# Patient Record
Sex: FEMALE | Race: OTHER | NOT HISPANIC OR LATINO
[De-identification: names, ages, dates, MRNs, and addresses within clinical notes are randomized per-mention and may not be internally consistent; named-entity substitution may affect disease eponyms.]

---

## 2018-03-08 ENCOUNTER — APPOINTMENT (OUTPATIENT)
Dept: OBGYN | Facility: CLINIC | Age: 33
End: 2018-03-08
Payer: COMMERCIAL

## 2018-03-08 ENCOUNTER — ASOB RESULT (OUTPATIENT)
Age: 33
End: 2018-03-08

## 2018-03-08 VITALS
WEIGHT: 148 LBS | BODY MASS INDEX: 24.66 KG/M2 | DIASTOLIC BLOOD PRESSURE: 80 MMHG | SYSTOLIC BLOOD PRESSURE: 125 MMHG | HEIGHT: 65 IN | HEART RATE: 80 BPM

## 2018-03-08 PROCEDURE — 99395 PREV VISIT EST AGE 18-39: CPT

## 2018-03-08 PROCEDURE — 76830 TRANSVAGINAL US NON-OB: CPT

## 2018-03-08 PROCEDURE — 99213 OFFICE O/P EST LOW 20 MIN: CPT | Mod: 25

## 2018-03-13 LAB
CANDIDA VAG CYTO: NOT DETECTED
G VAGINALIS+PREV SP MTYP VAG QL MICRO: NOT DETECTED
HPV HIGH+LOW RISK DNA PNL CVX: NOT DETECTED
T VAGINALIS VAG QL WET PREP: NOT DETECTED

## 2018-03-16 LAB — CYTOLOGY CVX/VAG DOC THIN PREP: NORMAL

## 2018-12-15 ENCOUNTER — EMERGENCY (EMERGENCY)
Facility: HOSPITAL | Age: 33
LOS: 1 days | Discharge: HOME | End: 2018-12-15
Attending: EMERGENCY MEDICINE
Payer: COMMERCIAL

## 2018-12-15 VITALS
RESPIRATION RATE: 17 BRPM | OXYGEN SATURATION: 99 % | SYSTOLIC BLOOD PRESSURE: 121 MMHG | DIASTOLIC BLOOD PRESSURE: 74 MMHG | WEIGHT: 145.06 LBS | HEART RATE: 67 BPM | TEMPERATURE: 98 F | HEIGHT: 65 IN

## 2018-12-15 DIAGNOSIS — Z98.89 OTHER SPECIFIED POSTPROCEDURAL STATES: Chronic | ICD-10-CM

## 2018-12-15 LAB
ALBUMIN SERPL ELPH-MCNC: 4.3 G/DL — SIGNIFICANT CHANGE UP (ref 3.5–5)
ALP SERPL-CCNC: 112 U/L — SIGNIFICANT CHANGE UP (ref 40–120)
ALT FLD-CCNC: 45 U/L DA — SIGNIFICANT CHANGE UP (ref 10–60)
ANION GAP SERPL CALC-SCNC: 8 MMOL/L — SIGNIFICANT CHANGE UP (ref 5–17)
AST SERPL-CCNC: 26 U/L — SIGNIFICANT CHANGE UP (ref 10–40)
BASOPHILS # BLD AUTO: 0.1 K/UL — SIGNIFICANT CHANGE UP (ref 0–0.2)
BASOPHILS NFR BLD AUTO: 1 % — SIGNIFICANT CHANGE UP (ref 0–2)
BILIRUB DIRECT SERPL-MCNC: 0.2 MG/DL — SIGNIFICANT CHANGE UP (ref 0–0.2)
BILIRUB INDIRECT FLD-MCNC: 0.6 MG/DL — SIGNIFICANT CHANGE UP (ref 0.2–1)
BILIRUB SERPL-MCNC: 0.8 MG/DL — SIGNIFICANT CHANGE UP (ref 0.2–1.2)
BUN SERPL-MCNC: 15 MG/DL — SIGNIFICANT CHANGE UP (ref 7–18)
CALCIUM SERPL-MCNC: 9.9 MG/DL — SIGNIFICANT CHANGE UP (ref 8.4–10.5)
CHLORIDE SERPL-SCNC: 103 MMOL/L — SIGNIFICANT CHANGE UP (ref 96–108)
CO2 SERPL-SCNC: 26 MMOL/L — SIGNIFICANT CHANGE UP (ref 22–31)
CREAT SERPL-MCNC: 0.86 MG/DL — SIGNIFICANT CHANGE UP (ref 0.5–1.3)
EOSINOPHIL # BLD AUTO: 0.1 K/UL — SIGNIFICANT CHANGE UP (ref 0–0.5)
EOSINOPHIL NFR BLD AUTO: 1.7 % — SIGNIFICANT CHANGE UP (ref 0–6)
GLUCOSE SERPL-MCNC: 73 MG/DL — SIGNIFICANT CHANGE UP (ref 70–99)
HCG SERPL-ACNC: <1 MIU/ML — SIGNIFICANT CHANGE UP
HCT VFR BLD CALC: 40.9 % — SIGNIFICANT CHANGE UP (ref 34.5–45)
HGB BLD-MCNC: 13.2 G/DL — SIGNIFICANT CHANGE UP (ref 11.5–15.5)
LIDOCAIN IGE QN: 107 U/L — SIGNIFICANT CHANGE UP (ref 73–393)
LYMPHOCYTES # BLD AUTO: 1.5 K/UL — SIGNIFICANT CHANGE UP (ref 1–3.3)
LYMPHOCYTES # BLD AUTO: 17.7 % — SIGNIFICANT CHANGE UP (ref 13–44)
MCHC RBC-ENTMCNC: 29.4 PG — SIGNIFICANT CHANGE UP (ref 27–34)
MCHC RBC-ENTMCNC: 32.2 GM/DL — SIGNIFICANT CHANGE UP (ref 32–36)
MCV RBC AUTO: 91.2 FL — SIGNIFICANT CHANGE UP (ref 80–100)
MONOCYTES # BLD AUTO: 0.5 K/UL — SIGNIFICANT CHANGE UP (ref 0–0.9)
MONOCYTES NFR BLD AUTO: 5.5 % — SIGNIFICANT CHANGE UP (ref 2–14)
NEUTROPHILS # BLD AUTO: 6.1 K/UL — SIGNIFICANT CHANGE UP (ref 1.8–7.4)
NEUTROPHILS NFR BLD AUTO: 74.2 % — SIGNIFICANT CHANGE UP (ref 43–77)
PLATELET # BLD AUTO: 191 K/UL — SIGNIFICANT CHANGE UP (ref 150–400)
POTASSIUM SERPL-MCNC: 3.8 MMOL/L — SIGNIFICANT CHANGE UP (ref 3.5–5.3)
POTASSIUM SERPL-SCNC: 3.8 MMOL/L — SIGNIFICANT CHANGE UP (ref 3.5–5.3)
PROT SERPL-MCNC: 8.9 G/DL — HIGH (ref 6–8.3)
RBC # BLD: 4.48 M/UL — SIGNIFICANT CHANGE UP (ref 3.8–5.2)
RBC # FLD: 11.4 % — SIGNIFICANT CHANGE UP (ref 10.3–14.5)
SODIUM SERPL-SCNC: 137 MMOL/L — SIGNIFICANT CHANGE UP (ref 135–145)
WBC # BLD: 8.3 K/UL — SIGNIFICANT CHANGE UP (ref 3.8–10.5)
WBC # FLD AUTO: 8.3 K/UL — SIGNIFICANT CHANGE UP (ref 3.8–10.5)

## 2018-12-15 PROCEDURE — 80048 BASIC METABOLIC PNL TOTAL CA: CPT

## 2018-12-15 PROCEDURE — 85027 COMPLETE CBC AUTOMATED: CPT

## 2018-12-15 PROCEDURE — 76705 ECHO EXAM OF ABDOMEN: CPT

## 2018-12-15 PROCEDURE — 83690 ASSAY OF LIPASE: CPT

## 2018-12-15 PROCEDURE — 76705 ECHO EXAM OF ABDOMEN: CPT | Mod: 26

## 2018-12-15 PROCEDURE — 99284 EMERGENCY DEPT VISIT MOD MDM: CPT | Mod: 25

## 2018-12-15 PROCEDURE — 99285 EMERGENCY DEPT VISIT HI MDM: CPT

## 2018-12-15 PROCEDURE — 80076 HEPATIC FUNCTION PANEL: CPT

## 2018-12-15 PROCEDURE — 84702 CHORIONIC GONADOTROPIN TEST: CPT

## 2018-12-15 RX ORDER — FAMOTIDINE 10 MG/ML
20 INJECTION INTRAVENOUS ONCE
Qty: 0 | Refills: 0 | Status: COMPLETED | OUTPATIENT
Start: 2018-12-15 | End: 2018-12-15

## 2018-12-15 RX ADMIN — FAMOTIDINE 20 MILLIGRAM(S): 10 INJECTION INTRAVENOUS at 15:09

## 2018-12-15 NOTE — ED PROVIDER NOTE - PMH
Elevated liver enzymes  "due to previous medicaiton for MS".  Pt reports she was switched to Copaxone in Sept 2014, and LFT's have come down.  Multiple sclerosis    Ovarian cyst

## 2018-12-15 NOTE — ED ADULT NURSE NOTE - CHPI ED NUR SYMPTOMS NEG
no blood in stool/no burning urination/no chills/no diarrhea/no abdominal distension/no dysuria/no fever/no hematuria

## 2018-12-15 NOTE — ED PROVIDER NOTE - PROGRESS NOTE DETAILS
pt remains asymptomatic in ED, abdominal exam remains benign, ready for dc with close pcp and gi f/u, given extensive return precautions which she showed understanding of.

## 2018-12-15 NOTE — ED PROVIDER NOTE - MEDICAL DECISION MAKING DETAILS
r/o pancreatitis, biliary colic and cholecystitis. Likely GERD with esophogeal spasm. Pt very well appearing. Will check labs, hcg, RUQ sono then reassess. Likely d/c with GI and PCP f/u and return precautions.

## 2018-12-15 NOTE — ED PROVIDER NOTE - OBJECTIVE STATEMENT
34 y/o F pt with Hx of MS, on gabapentin, presents to ED c/o 1 day of intermittent epigastric pain without radiation. No aggravating or alleviating factors. Every now and then she just feels severe pain in epigastric area that resolves on its own. No lower abdominal pain, chest pain, prior abdominal surgeries, alcohol use, NSAID use, urinary symptoms, vag bleeding/discharge.

## 2018-12-15 NOTE — ED ADULT NURSE NOTE - OBJECTIVE STATEMENT
Patient presents to ED c/o sharp epigastric pain starting this morning with 2 episodes of vomiting. Patient c/o crampy pain on pain scale 8/10 . Patient denies taking anything

## 2019-05-07 ENCOUNTER — APPOINTMENT (OUTPATIENT)
Dept: OBGYN | Facility: CLINIC | Age: 34
End: 2019-05-07
Payer: COMMERCIAL

## 2019-05-07 ENCOUNTER — ASOB RESULT (OUTPATIENT)
Age: 34
End: 2019-05-07

## 2019-05-07 VITALS
HEIGHT: 65 IN | BODY MASS INDEX: 24.83 KG/M2 | HEART RATE: 80 BPM | WEIGHT: 149 LBS | DIASTOLIC BLOOD PRESSURE: 75 MMHG | SYSTOLIC BLOOD PRESSURE: 122 MMHG

## 2019-05-07 DIAGNOSIS — N80.9 ENDOMETRIOSIS, UNSPECIFIED: ICD-10-CM

## 2019-05-07 PROCEDURE — 76830 TRANSVAGINAL US NON-OB: CPT

## 2019-05-07 PROCEDURE — 99395 PREV VISIT EST AGE 18-39: CPT

## 2019-05-07 NOTE — COUNSELING
[Breast Self Exam] : breast self exam [Nutrition] : nutrition [Exercise] : exercise [Vitamins/Supplements] : vitamins/supplements [Preconception Care] : preconception care [Other ___] : [unfilled]

## 2019-05-31 ENCOUNTER — APPOINTMENT (OUTPATIENT)
Dept: ANTEPARTUM | Facility: CLINIC | Age: 34
End: 2019-05-31
Payer: COMMERCIAL

## 2019-05-31 ENCOUNTER — ASOB RESULT (OUTPATIENT)
Age: 34
End: 2019-05-31

## 2019-05-31 PROCEDURE — 99241 OFFICE CONSULTATION NEW/ESTAB PATIENT 15 MIN: CPT | Mod: 25

## 2019-05-31 PROCEDURE — 76830 TRANSVAGINAL US NON-OB: CPT

## 2019-05-31 PROCEDURE — 76857 US EXAM PELVIC LIMITED: CPT

## 2019-06-14 ENCOUNTER — TRANSCRIPTION ENCOUNTER (OUTPATIENT)
Age: 34
End: 2019-06-14

## 2019-06-18 ENCOUNTER — LABORATORY RESULT (OUTPATIENT)
Age: 34
End: 2019-06-18

## 2019-06-18 ENCOUNTER — APPOINTMENT (OUTPATIENT)
Dept: OBGYN | Facility: CLINIC | Age: 34
End: 2019-06-18
Payer: COMMERCIAL

## 2019-06-18 ENCOUNTER — APPOINTMENT (OUTPATIENT)
Dept: OBGYN | Facility: CLINIC | Age: 34
End: 2019-06-18

## 2019-06-18 VITALS
SYSTOLIC BLOOD PRESSURE: 106 MMHG | BODY MASS INDEX: 24.83 KG/M2 | DIASTOLIC BLOOD PRESSURE: 65 MMHG | HEART RATE: 72 BPM | WEIGHT: 149 LBS | HEIGHT: 65 IN

## 2019-06-18 PROBLEM — N80.9 ENDOMETRIOSIS: Status: ACTIVE | Noted: 2018-03-08

## 2019-06-18 PROCEDURE — 99213 OFFICE O/P EST LOW 20 MIN: CPT

## 2019-06-20 LAB
C TRACH RRNA SPEC QL NAA+PROBE: NOT DETECTED
CYTOMEGALOVIRUS ABS IGM: <30 AU/ML
HERPES SIMPLEX 1 AND 2 ABS IGM: 1.38 RATIO
HIV1+2 AB SPEC QL IA.RAPID: NONREACTIVE
N GONORRHOEA RRNA SPEC QL NAA+PROBE: NOT DETECTED
RUBV IGG FLD-ACNC: 2.1 INDEX
RUBV IGG SER-IMP: POSITIVE
RUBV IGM FLD-ACNC: <20 AU/ML
SOURCE AMPLIFICATION: NORMAL
T GONDII AB SER-IMP: NEGATIVE
T GONDII AB SER-IMP: NEGATIVE
T GONDII IGG SER QL: <3 IU/ML
T GONDII IGM SER QL: <3 AU/ML
T3 SERPL-MCNC: 112 NG/DL
T4 FREE SERPL-MCNC: 1.2 NG/DL
TOXOPLASMA GONDII ABS IGM: <3 AU/ML
VZV AB TITR SER: POSITIVE
VZV IGG SER IF-ACNC: 974.2 INDEX
VZV IGM SER IF-ACNC: <0.91 INDEX

## 2019-06-25 ENCOUNTER — APPOINTMENT (OUTPATIENT)
Dept: OBGYN | Facility: CLINIC | Age: 34
End: 2019-06-25

## 2019-06-26 LAB — T PALLIDUM AB SER QL IA: NEGATIVE

## 2019-07-24 ENCOUNTER — APPOINTMENT (OUTPATIENT)
Dept: NEUROLOGY | Facility: CLINIC | Age: 34
End: 2019-07-24
Payer: COMMERCIAL

## 2019-07-24 VITALS
BODY MASS INDEX: 24.99 KG/M2 | WEIGHT: 150 LBS | SYSTOLIC BLOOD PRESSURE: 119 MMHG | HEIGHT: 65 IN | DIASTOLIC BLOOD PRESSURE: 75 MMHG | HEART RATE: 69 BPM

## 2019-07-24 PROCEDURE — 99205 OFFICE O/P NEW HI 60 MIN: CPT

## 2019-07-24 NOTE — HISTORY OF PRESENT ILLNESS
[FreeTextEntry1] : Reason for consult: MS\par \par HPI: MICHELET GERARD is a 33 year old woman \par \par 2012 - electrical current sensation upon neck flexion X 1.5 years. did not get to a neurologist.\par 2013 - abrupt abdominal tightness and numbness. saw neurologist, had MRIs showing lesions. Admitted to [facility]. No LP. Got IVMP. \par Started following with Dr. Ellington.\par Initially started DMF but LFTs went up to ~1300 per pt. Had liver biopsy. Stopped DMF within 3m, normalized.\par 9/2014 - started copaxone. stopped copaxone in summer 2018 and off for about 6 months. Some injection reactions mostly on legs. \par [id number] - one new brain lesion. otherwise MRIs have been reportedly stable per pt.\par Early 2019 - pain and numbness down L arm/shoulder.\par 6/2019 - restarted copaxone, but stopped in 7/2019.\par Had MRI 5/2019 and reportedly no new lesions.\par Actively trying to get pregnant.\par \par ROS/Current Sx:\par pain in the LLE \par brain fog, word finding difficulty. worse with heat.\par occ muscle spasm - kyler horse in R calf.\par \par PMHX:\par possible meningitis as a baby.\par hashimotos thyroiditis - recently diagnosed.\par MS\par endometriosis\par \par MEDS:\par gabapentin prn\par \par ALL: PCN\par \par SHx: no tob, occ etoh, no drugs. works as  for [employer].\par \par FHx: no MS, no AI, no neuro\par \par Vitals: unremarkable\par \par Exam:\par \par AO3.  Normally conversant.  Follows commands, names, and repeats.  Good attention.\par \par PERRL, VFF, EOMI, no nystagmus, face symmetric, TUP at midline.\par \par Motor: \par                                                 R:                               L:\par Del                                           5                                5\par Bi                                              5                               5\par Tri                                            5                               5\par Wrist Extensors                      5                               5\par Finger abductors                    5                               5\par                                         5                               5 \par \par HF                                           5                               5\par KE                                           5                               5\par KF                                           5                               5\par DF                                           5                               5\par PF                                           5                               5\par \par Tone                                       R                               L\par UE                                          0                                0 \par LE                                          0                                0\par \par Sensory                                RUE                      LUE                 RLE                LLE     \par LT                                           +                            +                      +                   +\par Vib                                          +                            +                      +                   +\par JPS                                         +                            +                      +                   +\par PP                                         +                            +                      +                   +\par Temp                                     +                            +                      +                   +\par \par Reflexes:\par                                              R                             L                            \par Biceps                                  1                            2\par BR                                        2                             2\par Triceps                              \par Pat                                       3                            3 \par AJ                                        3                           3\par \par TOES                                    F                            F\par \par Coordination:\par                                              R                             L                       \par FTN                                       0                             0 \par REAGAN                                      0                            0\par HTS                                      0                             0 \par \par Other                                                                          \par  \par Gait: normal, can heel, toe, tandem\par \par                     Assistance: none\par \par \par MRI T spine 2013 - read as t12 lesion\par MRI T spine 8/2015 - read as normal\par \par MRI brain 12/2014 - read as demyelinating lesions, improved from 2013.\par \par MRI C spine 5/2015 - read as t2h at C5-6.\par \par AP: RRMS based on hx and imaging reports. Likely clinically stable. essentially off copaxone since last summer. Actively trying to conceive. Discussed option to restart copaxone. Pt opts to hold off any DMT while trying to get pregnant.\par \par Advised her to RTC once during pregancy.\par \par \par \par \par \par \par

## 2019-08-28 ENCOUNTER — OTHER (OUTPATIENT)
Age: 34
End: 2019-08-28

## 2019-09-03 ENCOUNTER — CHART COPY (OUTPATIENT)
Age: 34
End: 2019-09-03

## 2019-09-04 ENCOUNTER — APPOINTMENT (OUTPATIENT)
Dept: NEUROLOGY | Facility: CLINIC | Age: 34
End: 2019-09-04
Payer: COMMERCIAL

## 2019-09-04 VITALS
BODY MASS INDEX: 24.99 KG/M2 | HEIGHT: 65 IN | SYSTOLIC BLOOD PRESSURE: 113 MMHG | HEART RATE: 76 BPM | WEIGHT: 150 LBS | DIASTOLIC BLOOD PRESSURE: 70 MMHG

## 2019-09-04 PROCEDURE — 99214 OFFICE O/P EST MOD 30 MIN: CPT | Mod: 25

## 2019-09-04 PROCEDURE — S0028: CPT

## 2019-09-04 PROCEDURE — 96365 THER/PROPH/DIAG IV INF INIT: CPT

## 2019-09-04 RX ORDER — METHYLPREDNISOLONE SODIUM SUCCINATE 1 G/16ML
1000 INJECTION, POWDER, LYOPHILIZED, FOR SOLUTION INTRAMUSCULAR; INTRAVENOUS
Qty: 1 | Refills: 0 | Status: ACTIVE | COMMUNITY
Start: 2019-09-04

## 2019-09-04 RX ORDER — METHYLPREDNISOLONE SODIUM SUCCINATE 500 MG/ML
500 INJECTION, POWDER, FOR SOLUTION INTRAMUSCULAR; INTRAVENOUS
Refills: 0 | Status: COMPLETED | OUTPATIENT
Start: 2019-09-04

## 2019-09-04 RX ADMIN — METHYLPREDNISOLONE SODIUM SUCCINATE 0 MG: 500 INJECTION, POWDER, FOR SOLUTION INTRAMUSCULAR; INTRAVENOUS at 00:00

## 2019-09-04 NOTE — HISTORY OF PRESENT ILLNESS
[FreeTextEntry1] : Initial hx 7/2019\par 2012 - electrical current sensation upon neck flexion X 1.5 years. did not get to a neurologist.\par 2013 - abrupt abdominal tightness and numbness. saw neurologist, had MRIs showing lesions. Admitted to [facility]. No LP. Got IVMP. \par Started following with Dr. Ellington.\par Initially started DMF but LFTs went up to ~1300 per pt. Had liver biopsy. Stopped DMF within 3m, normalized.\par 9/2014 - started copaxone. stopped copaxone in summer 2018 and off for about 6 months. Some injection reactions mostly on legs. \par [id number] - one new brain lesion. otherwise MRIs have been reportedly stable per pt.\par Early 2019 - pain and numbness down L arm/shoulder.\par 6/2019 - restarted copaxone, but stopped in 7/2019.\par Had MRI 5/2019 and reportedly no new lesions.\par Actively trying to get pregnant.\par \par Subj interval:\par \par 3wks of intense lhermitte's sign, has had it before but milder, worsened over past 1 month. Also with "heat" sensation on both calves L>R, no pain. Associated with extreme fatigue, more emotional/irritable, occ sharp pain in L foot. Had episode of L arm incoordination lasting seconds. More brain fog. No change in BB dysfunction, but chronic urinary urgency.\par \par Currently trying to conceive. Currently not pregnant.\par \par ROS/Current Sx:\par pain in the LLE \par brain fog, word finding difficulty. worse with heat.\par occ muscle spasm - kyler horse in R calf.\par urinary urgency\par \par PMHX:\par possible meningitis as a baby.\par hashimotos thyroiditis - recently diagnosed.\par MS\par endometriosis\par \par MEDS:\par none\par \par Exam:\par \par AO3. Normally conversant. Follows commands, names, and repeats. Good attention.\par \par PERRL, VFF, EOMI, no nystagmus, face symmetric, TUP at midline.\par \par Motor: \par    R:  L:\par Del   5  5\par Bi   5  5\par Tri   5  5\par Wrist Extensors  5  5\par Finger abductors  5  5\par    5  5 \par \par HF   5  5\par KE   5  5\par KF   5  5\par DF   5  5\par PF   5  5\par \par Tone   R  L\par UE   0  0 \par LE   0  0\par \par Sensory  RUE  LUE  RLE LLE \par LT   +  +  +  +\par Vib   +  +  +  mod\par JPS   +  +  +  +\par PP   +  +  +  +\par Temp   +  +  +  +\par \par Reflexes:\par    R  L  \par Biceps   2  2\par BR   2  2\par Triceps  \par Pat   3  3+ \par AJ   3  3\par \par TOES   F  F\par \par Coordination:\par    R  L  \par FTN   0  0 \par REAGAN   0  0\par HTS   0  0 \par \par Other     \par  \par Gait: normal, can heel, toe, tandem\par \par   Assistance: none\par \par \par MRI T spine 2013 - read as t12 lesion\par MRI T spine 8/2015 - read as normal\par \par MRI brain 12/2014 - read as demyelinating lesions, improved from 2013.\par \par MRI C spine 5/2015 - read as t2h at C5-6.\par \par AP: RRMS based on hx and imaging reports. Likely clinically stable. essentially off copaxone since last summer. Actively trying to conceive. Pt opts to hold off any DMT while trying to get pregnant.\par \par New sx, likely mild new MS relapse given mild new findings on exam.\par \par - IVMP x3-5d\par - continue off DMT for now while trying to conceive.

## 2019-09-05 ENCOUNTER — OUTPATIENT (OUTPATIENT)
Dept: OUTPATIENT SERVICES | Facility: HOSPITAL | Age: 34
LOS: 1 days | End: 2019-09-05
Payer: COMMERCIAL

## 2019-09-05 ENCOUNTER — APPOINTMENT (OUTPATIENT)
Dept: NEUROLOGY | Facility: CLINIC | Age: 34
End: 2019-09-05

## 2019-09-05 ENCOUNTER — CHART COPY (OUTPATIENT)
Age: 34
End: 2019-09-05

## 2019-09-05 ENCOUNTER — APPOINTMENT (OUTPATIENT)
Age: 34
End: 2019-09-05

## 2019-09-05 VITALS
OXYGEN SATURATION: 98 % | TEMPERATURE: 98 F | HEART RATE: 80 BPM | RESPIRATION RATE: 18 BRPM | DIASTOLIC BLOOD PRESSURE: 77 MMHG | SYSTOLIC BLOOD PRESSURE: 134 MMHG

## 2019-09-05 DIAGNOSIS — Z98.89 OTHER SPECIFIED POSTPROCEDURAL STATES: Chronic | ICD-10-CM

## 2019-09-05 DIAGNOSIS — G35 MULTIPLE SCLEROSIS: ICD-10-CM

## 2019-09-05 PROCEDURE — 96365 THER/PROPH/DIAG IV INF INIT: CPT

## 2019-09-05 RX ORDER — ACETAMINOPHEN 500 MG
1000 TABLET ORAL ONCE
Refills: 0 | Status: COMPLETED | OUTPATIENT
Start: 2019-09-05 | End: 2019-09-05

## 2019-09-05 RX ADMIN — Medication 1000 MILLIGRAM(S): at 15:20

## 2019-09-05 RX ADMIN — Medication 172 MILLIGRAM(S): at 14:45

## 2019-09-05 RX ADMIN — Medication 1000 MILLIGRAM(S): at 14:20

## 2019-09-05 RX ADMIN — Medication 1000 MILLIGRAM(S): at 16:15

## 2019-09-06 ENCOUNTER — APPOINTMENT (OUTPATIENT)
Age: 34
End: 2019-09-06

## 2019-09-06 ENCOUNTER — APPOINTMENT (OUTPATIENT)
Dept: NEUROLOGY | Facility: CLINIC | Age: 34
End: 2019-09-06
Payer: COMMERCIAL

## 2019-09-06 PROCEDURE — S0028: CPT

## 2019-10-16 ENCOUNTER — APPOINTMENT (OUTPATIENT)
Dept: NEUROLOGY | Facility: CLINIC | Age: 34
End: 2019-10-16
Payer: COMMERCIAL

## 2019-10-16 VITALS
HEIGHT: 65 IN | WEIGHT: 150 LBS | HEART RATE: 76 BPM | BODY MASS INDEX: 24.99 KG/M2 | SYSTOLIC BLOOD PRESSURE: 128 MMHG | DIASTOLIC BLOOD PRESSURE: 82 MMHG

## 2019-10-16 LAB
25(OH)D3 SERPL-MCNC: 37.1 NG/ML
ALBUMIN SERPL ELPH-MCNC: 4.7 G/DL
ALP BLD-CCNC: 102 U/L
ALT SERPL-CCNC: 31 U/L
ANION GAP SERPL CALC-SCNC: 12 MMOL/L
AST SERPL-CCNC: 25 U/L
BASOPHILS # BLD AUTO: 0.04 K/UL
BASOPHILS NFR BLD AUTO: 0.7 %
BILIRUB SERPL-MCNC: 0.3 MG/DL
BUN SERPL-MCNC: 13 MG/DL
CALCIUM SERPL-MCNC: 9.5 MG/DL
CHLORIDE SERPL-SCNC: 103 MMOL/L
CO2 SERPL-SCNC: 24 MMOL/L
CREAT SERPL-MCNC: 0.75 MG/DL
EOSINOPHIL # BLD AUTO: 0.19 K/UL
EOSINOPHIL NFR BLD AUTO: 3.1 %
GLUCOSE SERPL-MCNC: 91 MG/DL
HCT VFR BLD CALC: 36.8 %
HGB BLD-MCNC: 11.6 G/DL
IMM GRANULOCYTES NFR BLD AUTO: 0.3 %
LYMPHOCYTES # BLD AUTO: 1.86 K/UL
LYMPHOCYTES NFR BLD AUTO: 30.7 %
MAN DIFF?: NORMAL
MCHC RBC-ENTMCNC: 28.4 PG
MCHC RBC-ENTMCNC: 31.5 GM/DL
MCV RBC AUTO: 90 FL
MONOCYTES # BLD AUTO: 0.48 K/UL
MONOCYTES NFR BLD AUTO: 7.9 %
NEUTROPHILS # BLD AUTO: 3.47 K/UL
NEUTROPHILS NFR BLD AUTO: 57.3 %
PLATELET # BLD AUTO: 227 K/UL
POTASSIUM SERPL-SCNC: 4.1 MMOL/L
PROT SERPL-MCNC: 7.5 G/DL
RBC # BLD: 4.09 M/UL
RBC # FLD: 12.5 %
SODIUM SERPL-SCNC: 139 MMOL/L
T4 FREE SERPL-MCNC: 1 NG/DL
TSH SERPL-ACNC: 2.75 UIU/ML
WBC # FLD AUTO: 6.06 K/UL

## 2019-10-16 PROCEDURE — 99214 OFFICE O/P EST MOD 30 MIN: CPT

## 2019-10-22 NOTE — HISTORY OF PRESENT ILLNESS
[FreeTextEntry1] : Initial hx 7/2019\par 2012 - electrical current sensation upon neck flexion X 1.5 years. did not get to a neurologist.\par 2013 - abrupt abdominal tightness and numbness. saw neurologist, had MRIs showing lesions. Admitted to [facility]. No LP. Got IVMP. \par Started following with Dr. Ellington.\par Initially started DMF but LFTs went up to ~1300 per pt. Had liver biopsy. Stopped DMF within 3m, normalized.\par 9/2014 - started copaxone. stopped copaxone in summer 2018 and off for about 6 months. Some injection reactions mostly on legs. \par [id number] - one new brain lesion. otherwise MRIs have been reportedly stable per pt.\par Early 2019 - pain and numbness down L arm/shoulder.\par 6/2019 - restarted copaxone, but stopped in 7/2019.\par Had MRI 5/2019 and reportedly no new lesions.\par Actively trying to get pregnant.\par 9/2019 - intense lhermitte's sign, has had it before but milder, worsened over past 1 month. Also with "heat" sensation on both calves L>R, no pain. thought to be a possible relapse but MRI C+T showed no new lesions. got IVMP x3 with some improvement. \par \par Subj interval:\par \par lhermittes lasted about 1 month.\par \par Since 9/2019, brain fog and fatigue have become worse. Some depressed mood - sees a therapist. denies suicidality.\par \par Currently trying to conceive. Currently not pregnant.\par \par ROS/Current Sx:\par pain in the LLE \par brain fog, word finding difficulty. worse with heat.\par occ muscle spasm - kyler horse in R calf.\par urinary urgency\par \par PMHX:\par possible meningitis as a baby.\par hashimotos thyroiditis - recently diagnosed.\par MS\par endometriosis\par \par MEDS:\par none\par \par Exam:\par \par AO3. Normally conversant. Follows commands, names, and repeats. Good attention.\par \par PERRL, VFF, EOMI, no nystagmus, face symmetric, TUP at midline.\par \par Motor: \par  R: L:\par Del 5 5\par Bi 5 5\par Tri 5 5\par Wrist Extensors 5 5\par Finger abductors 5 5\par  5 5 \par \par HF 5 5\par KE 5 5\par KF 5 5\par DF 5 5\par PF 5 5\par \par Tone R L\par UE 0 0 \par LE 0 0\par \par Sensory RUE LUE RLE LLE \par LT + + + +\par Vib + + + mod\par JPS + + + +\par PP + + + +\par Temp + + + +\par \par Reflexes:\par  R L \par Biceps 2 2\par BR 2 2\par Triceps \par Pat 3 3+ \par AJ 3 3\par \par TOES F F\par \par Coordination:\par  R L \par FTN 0 0 \par REAGAN 0 0\par HTS 0 0 \par \par Other \par  \par Gait: normal, can heel, toe, tandem\par \par  Assistance: none\par \par \par MRI T spine 2013 - read as t12 lesion\par MRI T spine 8/2015 - read as normal\par MRI T spine 9/2019 - read as unchanged. i agree\par \par MRI brain 12/2014 - read as demyelinating lesions, improved from 2013.\par \par MRI C spine 5/2015 - read as t2h at C5-6.\par MRI C spine 9/2019 read as unchanged c5/6 lesion. i agree\par \par \par AP: RRMS based on hx and imaging. Essentially off copaxone since last summer. Actively trying to conceive. Pt opts to hold off any DMT while trying to get pregnant.\par \par Worsened fatigue and brain fog. However, exam is unchanged.\par \par - check blood work inc TSH\par - continue off DMT for now while trying to conceive. \par - RTC 3m

## 2020-01-08 ENCOUNTER — APPOINTMENT (OUTPATIENT)
Dept: NEUROLOGY | Facility: CLINIC | Age: 35
End: 2020-01-08
Payer: COMMERCIAL

## 2020-01-08 ENCOUNTER — APPOINTMENT (OUTPATIENT)
Dept: NEUROLOGY | Facility: CLINIC | Age: 35
End: 2020-01-08

## 2020-01-08 VITALS
BODY MASS INDEX: 25.83 KG/M2 | DIASTOLIC BLOOD PRESSURE: 78 MMHG | SYSTOLIC BLOOD PRESSURE: 123 MMHG | HEIGHT: 65 IN | WEIGHT: 155 LBS | HEART RATE: 65 BPM

## 2020-01-08 PROCEDURE — 99214 OFFICE O/P EST MOD 30 MIN: CPT

## 2020-01-08 NOTE — HISTORY OF PRESENT ILLNESS
[FreeTextEntry1] : Initial hx 7/2019\par 2012 - electrical current sensation upon neck flexion X 1.5 years. did not get to a neurologist.\par 2013 - abrupt abdominal tightness and numbness. saw neurologist, had MRIs showing lesions. Admitted to [facility]. No LP. Got IVMP. \par Started following with Dr. Ellington.\par Initially started DMF but LFTs went up to ~1300 per pt. Had liver biopsy. Stopped DMF within 3m, normalized.\par 9/2014 - started copaxone. stopped copaxone in summer 2018 and off for about 6 months. Some injection reactions mostly on legs. \par [id number] - one new brain lesion. otherwise MRIs have been reportedly stable per pt.\par Early 2019 - pain and numbness down L arm/shoulder.\par 6/2019 - restarted copaxone, but stopped in 7/2019.\par Had MRI 5/2019 and reportedly no new lesions.\par Actively trying to get pregnant.\par 9/2019 - intense lhermitte's sign, has had it before but milder, worsened over past 1 month. Also with "heat" sensation on both calves L>R, no pain. thought to be a possible relapse but MRI C+T showed no new lesions. got IVMP x3 with some improvement. lhermittes lasted about 1 month.\par \par Subj interval:\par \par Dental work done in 11/2019. Soon after, developed vertigo - dizziness, strange metal taste in mouth, nausea. Thought she was pregnant but was not. Went to ENT who felt like she had BPPV but testing was negative. Some L hand clumsiness in recent weeks. This persists.\par \par Currently trying to conceive. Currently not pregnant.\par \par ROS/Current Sx:\par pain in the LLE \par brain fog, word finding difficulty. worse with heat.\par occ muscle spasm - kyler horse in R calf.\par urinary urgency\par \par PMHX:\par possible meningitis as a baby.\par hashimotos thyroiditis - recently diagnosed.\par MS\par endometriosis\par \par MEDS:\par none\par \par Exam:\par \par AO3. Normally conversant. Follows commands, names, and repeats. Good attention.\par \par PERRL, VFF, EOMI, no nystagmus, face symmetric, TUP at midline.\par \par Motor: \par  R: L:\par Del 5 5\par Bi 5 5\par Tri 5 5\par Wrist Extensors 5 5\par Finger abductors 5 5\par  5 5 \par \par HF 5 5\par KE 5 5\par KF 5 5\par DF 5 5\par PF 5 5\par \par Tone R L\par UE 0 0 \par LE 0 0\par \par Sensory RUE LUE RLE LLE \par LT + + + +\par Vib + + + mod\par JPS + + + +\par PP + + + +\par Temp + + + +\par \par Reflexes:\par  R L \par Biceps 2 2\par BR 2 2\par Triceps \par Pat 3 3+ \par AJ 3 3\par \par TOES F F\par \par Coordination:\par  R L \par FTN 0 0 \par REAGAN 0 0\par HTS 0 0 \par \par Other \par  \par Gait: normal, can heel, toe, tandem\par \par  Assistance: none\par \par \par MRI T spine 2013 - read as t12 lesion\par MRI T spine 8/2015 - read as normal\par MRI T spine 9/2019 - read as unchanged. i agree\par \par MRI brain 12/2014 - read as demyelinating lesions, improved from 2013.\par \par MRI C spine 5/2015 - read as t2h at C5-6.\par MRI C spine 9/2019 read as unchanged c5/6 lesion. i agree\par \par \par AP: RRMS based on hx and imaging. Essentially off copaxone since summer 2018. Actively trying to conceive. Pt opts to hold off any DMT while trying to get pregnant.\par \par New dizziness and dysguesia in 11/2019 which persists. ? mild relapse.\par \par - new brain MRI now given ? new relapse. If substantial change, will recommend to go back on DMT.\par - will attempt to obtain prior MRI brain on CD for review.\par - RTC 3m

## 2020-01-13 ENCOUNTER — APPOINTMENT (OUTPATIENT)
Dept: OBGYN | Facility: CLINIC | Age: 35
End: 2020-01-13
Payer: COMMERCIAL

## 2020-01-13 VITALS
SYSTOLIC BLOOD PRESSURE: 115 MMHG | DIASTOLIC BLOOD PRESSURE: 74 MMHG | HEIGHT: 65 IN | WEIGHT: 158 LBS | BODY MASS INDEX: 26.33 KG/M2 | HEART RATE: 88 BPM

## 2020-01-13 PROCEDURE — 99213 OFFICE O/P EST LOW 20 MIN: CPT

## 2020-01-14 ENCOUNTER — FORM ENCOUNTER (OUTPATIENT)
Age: 35
End: 2020-01-14

## 2020-01-14 LAB
BASOPHILS # BLD AUTO: 0.04 K/UL
BASOPHILS NFR BLD AUTO: 0.6 %
EOSINOPHIL # BLD AUTO: 0.22 K/UL
EOSINOPHIL NFR BLD AUTO: 3.4 %
HCG SERPL-MCNC: <1 MIU/ML
HCT VFR BLD CALC: 36.8 %
HGB BLD-MCNC: 11.9 G/DL
IMM GRANULOCYTES NFR BLD AUTO: 0.3 %
LYMPHOCYTES # BLD AUTO: 1.85 K/UL
LYMPHOCYTES NFR BLD AUTO: 28.7 %
MAN DIFF?: NORMAL
MCHC RBC-ENTMCNC: 29.5 PG
MCHC RBC-ENTMCNC: 32.3 GM/DL
MCV RBC AUTO: 91.1 FL
MONOCYTES # BLD AUTO: 0.7 K/UL
MONOCYTES NFR BLD AUTO: 10.9 %
NEUTROPHILS # BLD AUTO: 3.61 K/UL
NEUTROPHILS NFR BLD AUTO: 56.1 %
PLATELET # BLD AUTO: 243 K/UL
RBC # BLD: 4.04 M/UL
RBC # FLD: 12.5 %
WBC # FLD AUTO: 6.44 K/UL

## 2020-01-15 ENCOUNTER — OUTPATIENT (OUTPATIENT)
Dept: OUTPATIENT SERVICES | Facility: HOSPITAL | Age: 35
LOS: 1 days | End: 2020-01-15
Payer: COMMERCIAL

## 2020-01-15 ENCOUNTER — APPOINTMENT (OUTPATIENT)
Dept: MRI IMAGING | Facility: IMAGING CENTER | Age: 35
End: 2020-01-15

## 2020-01-15 DIAGNOSIS — Z98.89 OTHER SPECIFIED POSTPROCEDURAL STATES: Chronic | ICD-10-CM

## 2020-01-15 DIAGNOSIS — G35 MULTIPLE SCLEROSIS: ICD-10-CM

## 2020-01-15 PROCEDURE — A9585: CPT

## 2020-01-15 PROCEDURE — 70553 MRI BRAIN STEM W/O & W/DYE: CPT

## 2020-01-15 PROCEDURE — 70553 MRI BRAIN STEM W/O & W/DYE: CPT | Mod: 26

## 2020-01-21 ENCOUNTER — CHART COPY (OUTPATIENT)
Age: 35
End: 2020-01-21

## 2020-01-24 ENCOUNTER — APPOINTMENT (OUTPATIENT)
Dept: NEUROLOGY | Facility: CLINIC | Age: 35
End: 2020-01-24
Payer: COMMERCIAL

## 2020-01-24 VITALS
DIASTOLIC BLOOD PRESSURE: 70 MMHG | TEMPERATURE: 98.6 F | WEIGHT: 158 LBS | HEIGHT: 65 IN | BODY MASS INDEX: 26.33 KG/M2 | OXYGEN SATURATION: 97 % | HEART RATE: 75 BPM | SYSTOLIC BLOOD PRESSURE: 109 MMHG

## 2020-01-24 PROCEDURE — 99214 OFFICE O/P EST MOD 30 MIN: CPT

## 2020-10-04 ENCOUNTER — TRANSCRIPTION ENCOUNTER (OUTPATIENT)
Age: 35
End: 2020-10-04

## 2020-10-14 ENCOUNTER — APPOINTMENT (OUTPATIENT)
Dept: OBGYN | Facility: CLINIC | Age: 35
End: 2020-10-14
Payer: COMMERCIAL

## 2020-10-14 VITALS
SYSTOLIC BLOOD PRESSURE: 122 MMHG | BODY MASS INDEX: 27.49 KG/M2 | WEIGHT: 165 LBS | TEMPERATURE: 97.9 F | DIASTOLIC BLOOD PRESSURE: 75 MMHG | HEART RATE: 66 BPM | HEIGHT: 65 IN

## 2020-10-14 DIAGNOSIS — Z01.419 ENCOUNTER FOR GYNECOLOGICAL EXAMINATION (GENERAL) (ROUTINE) W/OUT ABNORMAL FINDINGS: ICD-10-CM

## 2020-10-14 PROCEDURE — 99395 PREV VISIT EST AGE 18-39: CPT

## 2020-11-10 NOTE — HISTORY OF PRESENT ILLNESS
[TextBox_4] : 34 yo F here for WWE. Reports difficulty conceiving within this last year. Sexually active with on male partner. Regular menses.

## 2020-11-19 DIAGNOSIS — Z98.890 OTHER SPECIFIED POSTPROCEDURAL STATES: ICD-10-CM

## 2020-12-03 ENCOUNTER — NON-APPOINTMENT (OUTPATIENT)
Age: 35
End: 2020-12-03

## 2020-12-14 ENCOUNTER — APPOINTMENT (OUTPATIENT)
Dept: NEUROLOGY | Facility: CLINIC | Age: 35
End: 2020-12-14

## 2020-12-24 LAB
CYTOLOGY CVX/VAG DOC THIN PREP: NORMAL
HPV HIGH+LOW RISK DNA PNL CVX: NOT DETECTED

## 2021-01-08 ENCOUNTER — APPOINTMENT (OUTPATIENT)
Dept: MRI IMAGING | Facility: CLINIC | Age: 36
End: 2021-01-08
Payer: COMMERCIAL

## 2021-01-08 PROCEDURE — 72156 MRI NECK SPINE W/O & W/DYE: CPT

## 2021-01-08 PROCEDURE — A9585C: CUSTOM

## 2021-01-08 PROCEDURE — 70553 MRI BRAIN STEM W/O & W/DYE: CPT

## 2021-01-08 PROCEDURE — A9585: CPT | Mod: JW

## 2021-01-13 ENCOUNTER — APPOINTMENT (OUTPATIENT)
Dept: OBGYN | Facility: CLINIC | Age: 36
End: 2021-01-13

## 2021-03-31 ENCOUNTER — APPOINTMENT (OUTPATIENT)
Dept: MRI IMAGING | Facility: HOSPITAL | Age: 36
End: 2021-03-31

## 2021-03-31 ENCOUNTER — OUTPATIENT (OUTPATIENT)
Dept: OUTPATIENT SERVICES | Facility: HOSPITAL | Age: 36
LOS: 1 days | End: 2021-03-31
Payer: COMMERCIAL

## 2021-03-31 DIAGNOSIS — Z98.89 OTHER SPECIFIED POSTPROCEDURAL STATES: Chronic | ICD-10-CM

## 2021-03-31 DIAGNOSIS — Z00.8 ENCOUNTER FOR OTHER GENERAL EXAMINATION: ICD-10-CM

## 2021-03-31 PROCEDURE — 72148 MRI LUMBAR SPINE W/O DYE: CPT | Mod: 26

## 2021-03-31 PROCEDURE — 72146 MRI CHEST SPINE W/O DYE: CPT | Mod: 26

## 2021-04-30 ENCOUNTER — APPOINTMENT (OUTPATIENT)
Dept: OBGYN | Facility: CLINIC | Age: 36
End: 2021-04-30
Payer: COMMERCIAL

## 2021-04-30 ENCOUNTER — ASOB RESULT (OUTPATIENT)
Age: 36
End: 2021-04-30

## 2021-04-30 VITALS
SYSTOLIC BLOOD PRESSURE: 118 MMHG | TEMPERATURE: 97.7 F | HEART RATE: 79 BPM | HEIGHT: 65 IN | DIASTOLIC BLOOD PRESSURE: 65 MMHG | BODY MASS INDEX: 28.49 KG/M2 | WEIGHT: 171 LBS

## 2021-04-30 DIAGNOSIS — R10.2 PELVIC AND PERINEAL PAIN: ICD-10-CM

## 2021-04-30 PROCEDURE — 99213 OFFICE O/P EST LOW 20 MIN: CPT

## 2021-04-30 PROCEDURE — 99072 ADDL SUPL MATRL&STAF TM PHE: CPT

## 2021-04-30 PROCEDURE — 76830 TRANSVAGINAL US NON-OB: CPT

## 2021-04-30 RX ORDER — MECLIZINE HYDROCHLORIDE 12.5 MG/1
12.5 TABLET ORAL 3 TIMES DAILY
Qty: 30 | Refills: 1 | Status: DISCONTINUED | COMMUNITY
Start: 2020-01-24 | End: 2021-04-30

## 2021-04-30 RX ORDER — CLARITHROMYCIN 500 MG/1
500 TABLET, FILM COATED ORAL
Qty: 14 | Refills: 0 | Status: DISCONTINUED | COMMUNITY
Start: 2018-02-22 | End: 2021-04-30

## 2021-04-30 NOTE — PHYSICAL EXAM
[Appropriately responsive] : appropriately responsive [Alert] : alert [Soft] : soft [No Acute Distress] : no acute distress [Non-tender] : non-tender [Oriented x3] : oriented x3

## 2021-04-30 NOTE — HISTORY OF PRESENT ILLNESS
[FreeTextEntry1] : 35 y.o F here for follow up for ovarian cysts. TVUS today showing normal pelvic architecture. Left corpus leutum seen. LMP 4/18. Regular menses.

## 2021-04-30 NOTE — COUNSELING
[Nutrition/ Exercise/ Weight Management] : nutrition, exercise, weight management [Preconception Care/ Fertility options] : preconception care, fertility options [Medication Management] : medication management

## 2021-04-30 NOTE — DISCUSSION/SUMMARY
[FreeTextEntry1] : TVUS normal, reassurance provided. discussed with patient conception and medications currently prescribed.

## 2021-09-28 PROBLEM — Z00.00 ENCOUNTER FOR PREVENTIVE HEALTH EXAMINATION: Noted: 2021-09-28

## 2021-10-03 ENCOUNTER — APPOINTMENT (OUTPATIENT)
Dept: MRI IMAGING | Facility: CLINIC | Age: 36
End: 2021-10-03
Payer: COMMERCIAL

## 2021-10-03 ENCOUNTER — OUTPATIENT (OUTPATIENT)
Dept: OUTPATIENT SERVICES | Facility: HOSPITAL | Age: 36
LOS: 1 days | End: 2021-10-03

## 2021-10-03 DIAGNOSIS — Z98.89 OTHER SPECIFIED POSTPROCEDURAL STATES: Chronic | ICD-10-CM

## 2021-10-03 PROCEDURE — 70551 MRI BRAIN STEM W/O DYE: CPT | Mod: 26

## 2021-10-03 PROCEDURE — 72141 MRI NECK SPINE W/O DYE: CPT | Mod: 26

## 2021-10-03 PROCEDURE — 72146 MRI CHEST SPINE W/O DYE: CPT | Mod: 26

## 2021-12-28 ENCOUNTER — TRANSCRIPTION ENCOUNTER (OUTPATIENT)
Age: 36
End: 2021-12-28

## 2022-01-05 ENCOUNTER — APPOINTMENT (OUTPATIENT)
Dept: OBGYN | Facility: CLINIC | Age: 37
End: 2022-01-05
Payer: COMMERCIAL

## 2022-01-05 VITALS
HEIGHT: 65 IN | BODY MASS INDEX: 23.82 KG/M2 | WEIGHT: 143 LBS | SYSTOLIC BLOOD PRESSURE: 120 MMHG | DIASTOLIC BLOOD PRESSURE: 66 MMHG

## 2022-01-05 DIAGNOSIS — Z01.419 ENCOUNTER FOR GYNECOLOGICAL EXAMINATION (GENERAL) (ROUTINE) W/OUT ABNORMAL FINDINGS: ICD-10-CM

## 2022-01-05 PROCEDURE — 99214 OFFICE O/P EST MOD 30 MIN: CPT

## 2022-01-05 NOTE — PLAN
[FreeTextEntry1] : 37 y/o female presenting for annual exam:\par -f/u pap and GC/CT\par -Patient sees breast surgeon annually due to her histroy of cysts in her breasts. Patient goes for annual mammogram and will follow up with breast surgeon for next mammogram appointment.\par -Contraception: Patient denies at this time- patient desires pregnancy\par -f/u PRN\par

## 2022-01-05 NOTE — HISTORY OF PRESENT ILLNESS
[Patient reported mammogram was normal] : Patient reported mammogram was normal [Patient reported PAP Smear was normal] : Patient reported PAP Smear was normal [Y] : Positive pregnancy history [Currently Active] : currently active [Men] : men [Vaginal] : vaginal [No] : No [FreeTextEntry1] : Patient is a 37 y/o presenting for an annual visit. She is feeling well and is without complaints. She denies vaginal itching, odor and discharge. Denies urinary urgency, frequency and dysuria.\par  [Mammogramdate] : 2020 [PapSmeardate] : 2020 [LMPDate] : 12/20/21 [PGHxTotal] : 0 [PGHxAbortions] : 0

## 2022-01-05 NOTE — PHYSICAL EXAM
[Chaperone Present] : A chaperone was present in the examining room during all aspects of the physical examination [Appropriately responsive] : appropriately responsive [Alert] : alert [No Acute Distress] : no acute distress [Soft] : soft [Non-tender] : non-tender [Non-distended] : non-distended [No HSM] : No HSM [No Lesions] : no lesions [No Mass] : no mass [Oriented x3] : oriented x3 [Examination Of The Breasts] : a normal appearance [No Masses] : no breast masses were palpable [Labia Majora] : normal [Labia Minora] : normal [Normal] : normal [Uterine Adnexae] : normal [FreeTextEntry1] : Al

## 2022-01-07 LAB
HPV 16 E6+E7 MRNA CVX QL NAA+PROBE: NOT DETECTED
HPV HIGH+LOW RISK DNA PNL CVX: NOT DETECTED
HPV18+45 E6+E7 MRNA CVX QL NAA+PROBE: NOT DETECTED

## 2022-01-09 LAB
C TRACH RRNA SPEC QL NAA+PROBE: NOT DETECTED
CYTOLOGY CVX/VAG DOC THIN PREP: NORMAL
N GONORRHOEA RRNA SPEC QL NAA+PROBE: NOT DETECTED
SOURCE AMPLIFICATION: NORMAL

## 2022-05-04 ENCOUNTER — APPOINTMENT (OUTPATIENT)
Dept: MRI IMAGING | Facility: CLINIC | Age: 37
End: 2022-05-04
Payer: COMMERCIAL

## 2022-05-04 PROCEDURE — A9585: CPT | Mod: JW

## 2022-05-04 PROCEDURE — 70553 MRI BRAIN STEM W/O & W/DYE: CPT

## 2022-10-14 ENCOUNTER — APPOINTMENT (OUTPATIENT)
Dept: MRI IMAGING | Facility: HOSPITAL | Age: 37
End: 2022-10-14

## 2022-10-14 ENCOUNTER — OUTPATIENT (OUTPATIENT)
Dept: OUTPATIENT SERVICES | Facility: HOSPITAL | Age: 37
LOS: 1 days | End: 2022-10-14
Payer: COMMERCIAL

## 2022-10-14 DIAGNOSIS — Z98.89 OTHER SPECIFIED POSTPROCEDURAL STATES: Chronic | ICD-10-CM

## 2022-10-14 DIAGNOSIS — Z00.8 ENCOUNTER FOR OTHER GENERAL EXAMINATION: ICD-10-CM

## 2022-10-14 PROCEDURE — 70551 MRI BRAIN STEM W/O DYE: CPT | Mod: 26

## 2022-10-14 PROCEDURE — 70551 MRI BRAIN STEM W/O DYE: CPT

## 2023-02-07 ENCOUNTER — APPOINTMENT (OUTPATIENT)
Dept: MRI IMAGING | Facility: CLINIC | Age: 38
End: 2023-02-07

## 2023-02-07 ENCOUNTER — APPOINTMENT (OUTPATIENT)
Dept: MRI IMAGING | Facility: CLINIC | Age: 38
End: 2023-02-07
Payer: COMMERCIAL

## 2023-02-07 ENCOUNTER — OUTPATIENT (OUTPATIENT)
Dept: OUTPATIENT SERVICES | Facility: HOSPITAL | Age: 38
LOS: 1 days | End: 2023-02-07
Payer: COMMERCIAL

## 2023-02-07 DIAGNOSIS — Z98.89 OTHER SPECIFIED POSTPROCEDURAL STATES: Chronic | ICD-10-CM

## 2023-02-07 DIAGNOSIS — Z00.8 ENCOUNTER FOR OTHER GENERAL EXAMINATION: ICD-10-CM

## 2023-02-07 PROCEDURE — 70551 MRI BRAIN STEM W/O DYE: CPT

## 2023-02-07 PROCEDURE — 72141 MRI NECK SPINE W/O DYE: CPT | Mod: 26

## 2023-02-07 PROCEDURE — 72141 MRI NECK SPINE W/O DYE: CPT

## 2023-02-07 PROCEDURE — 70551 MRI BRAIN STEM W/O DYE: CPT | Mod: 26

## 2024-02-05 ENCOUNTER — APPOINTMENT (OUTPATIENT)
Dept: MRI IMAGING | Facility: CLINIC | Age: 39
End: 2024-02-05
Payer: COMMERCIAL

## 2024-02-05 PROCEDURE — 70551 MRI BRAIN STEM W/O DYE: CPT

## 2024-02-05 PROCEDURE — 72146 MRI CHEST SPINE W/O DYE: CPT

## 2024-04-04 ENCOUNTER — APPOINTMENT (OUTPATIENT)
Dept: HUMAN REPRODUCTION | Facility: CLINIC | Age: 39
End: 2024-04-04
Payer: COMMERCIAL

## 2024-04-04 PROCEDURE — 99205 OFFICE O/P NEW HI 60 MIN: CPT | Mod: 25

## 2024-04-04 PROCEDURE — 76830 TRANSVAGINAL US NON-OB: CPT

## 2024-04-04 PROCEDURE — 36415 COLL VENOUS BLD VENIPUNCTURE: CPT

## 2024-04-11 ENCOUNTER — APPOINTMENT (OUTPATIENT)
Dept: OBGYN | Facility: CLINIC | Age: 39
End: 2024-04-11
Payer: COMMERCIAL

## 2024-04-11 VITALS
DIASTOLIC BLOOD PRESSURE: 87 MMHG | SYSTOLIC BLOOD PRESSURE: 125 MMHG | BODY MASS INDEX: 23.66 KG/M2 | WEIGHT: 142 LBS | HEIGHT: 65 IN

## 2024-04-11 PROCEDURE — 99202 OFFICE O/P NEW SF 15 MIN: CPT

## 2024-04-15 NOTE — HISTORY OF PRESENT ILLNESS
[FreeTextEntry1] : 38 year old female presenting for pre pregnancy consultation. Patient reports Multiple Sclerosis and currently taking Copaxone 40mg 3x a week with relief. She also reports Hashimoto's disease, currently on levothyroxine 25mcg and Liothyronine 5mcg . Patient states she is considering IUI

## 2024-04-15 NOTE — DISCUSSION/SUMMARY
[FreeTextEntry1] : -discussed implications of MS during pregnancy  -discussed MS doenst usually tend to get worse in pregnancy  -Advised patient to discontinue medications for MS prior to attempting conception -recommended close follow ups with her physicians  -pt will monitor S/S of MS after discontinuing meds  -patient to continue meds for Hashimoto's  -recommended thyroid function bw -At this time patient is cleared for IUI 
yes

## 2024-04-16 ENCOUNTER — APPOINTMENT (OUTPATIENT)
Dept: HUMAN REPRODUCTION | Facility: CLINIC | Age: 39
End: 2024-04-16
Payer: COMMERCIAL

## 2024-04-16 PROCEDURE — 99214 OFFICE O/P EST MOD 30 MIN: CPT | Mod: 25

## 2024-04-16 PROCEDURE — 74740 X-RAY FEMALE GENITAL TRACT: CPT

## 2024-04-16 PROCEDURE — 58999I: CUSTOM

## 2024-04-16 PROCEDURE — 76831 ECHO EXAM UTERUS: CPT

## 2024-04-16 PROCEDURE — 58340 CATHETER FOR HYSTEROGRAPHY: CPT

## 2024-04-19 ENCOUNTER — APPOINTMENT (OUTPATIENT)
Dept: HUMAN REPRODUCTION | Facility: CLINIC | Age: 39
End: 2024-04-19

## 2024-04-19 ENCOUNTER — APPOINTMENT (OUTPATIENT)
Dept: HUMAN REPRODUCTION | Facility: CLINIC | Age: 39
End: 2024-04-19
Payer: COMMERCIAL

## 2024-04-19 PROCEDURE — 99213 OFFICE O/P EST LOW 20 MIN: CPT | Mod: 25

## 2024-04-19 PROCEDURE — 36415 COLL VENOUS BLD VENIPUNCTURE: CPT

## 2024-04-19 PROCEDURE — 76857 US EXAM PELVIC LIMITED: CPT

## 2024-04-21 ENCOUNTER — APPOINTMENT (OUTPATIENT)
Dept: HUMAN REPRODUCTION | Facility: CLINIC | Age: 39
End: 2024-04-21
Payer: COMMERCIAL

## 2024-04-21 ENCOUNTER — APPOINTMENT (OUTPATIENT)
Dept: HUMAN REPRODUCTION | Facility: CLINIC | Age: 39
End: 2024-04-21

## 2024-04-21 PROCEDURE — 36415 COLL VENOUS BLD VENIPUNCTURE: CPT

## 2024-04-21 PROCEDURE — 76857 US EXAM PELVIC LIMITED: CPT

## 2024-04-21 PROCEDURE — 99213 OFFICE O/P EST LOW 20 MIN: CPT | Mod: 25

## 2024-04-22 ENCOUNTER — APPOINTMENT (OUTPATIENT)
Dept: HUMAN REPRODUCTION | Facility: CLINIC | Age: 39
End: 2024-04-22
Payer: COMMERCIAL

## 2024-04-22 PROCEDURE — 58322 ARTIFICIAL INSEMINATION: CPT

## 2024-04-22 PROCEDURE — 89260 SPERM ISOLATION SIMPLE: CPT

## 2024-05-06 ENCOUNTER — APPOINTMENT (OUTPATIENT)
Dept: HUMAN REPRODUCTION | Facility: CLINIC | Age: 39
End: 2024-05-06
Payer: COMMERCIAL

## 2024-05-06 PROCEDURE — 76830 TRANSVAGINAL US NON-OB: CPT

## 2024-05-06 PROCEDURE — 36415 COLL VENOUS BLD VENIPUNCTURE: CPT

## 2024-05-06 PROCEDURE — 99213 OFFICE O/P EST LOW 20 MIN: CPT | Mod: 25

## 2024-05-17 ENCOUNTER — APPOINTMENT (OUTPATIENT)
Dept: HUMAN REPRODUCTION | Facility: CLINIC | Age: 39
End: 2024-05-17
Payer: COMMERCIAL

## 2024-05-17 PROCEDURE — 99213 OFFICE O/P EST LOW 20 MIN: CPT | Mod: 25

## 2024-05-17 PROCEDURE — 76830 TRANSVAGINAL US NON-OB: CPT

## 2024-05-17 PROCEDURE — 36415 COLL VENOUS BLD VENIPUNCTURE: CPT

## 2024-05-18 ENCOUNTER — APPOINTMENT (OUTPATIENT)
Dept: HUMAN REPRODUCTION | Facility: CLINIC | Age: 39
End: 2024-05-18
Payer: COMMERCIAL

## 2024-05-18 PROCEDURE — 89261 SPERM ISOLATION COMPLEX: CPT

## 2024-05-18 PROCEDURE — 99213 OFFICE O/P EST LOW 20 MIN: CPT | Mod: 25

## 2024-05-18 PROCEDURE — 76857 US EXAM PELVIC LIMITED: CPT

## 2024-05-18 PROCEDURE — 99459 PELVIC EXAMINATION: CPT

## 2024-05-18 PROCEDURE — 58322 ARTIFICIAL INSEMINATION: CPT

## 2024-05-31 ENCOUNTER — APPOINTMENT (OUTPATIENT)
Dept: HUMAN REPRODUCTION | Facility: CLINIC | Age: 39
End: 2024-05-31
Payer: COMMERCIAL

## 2024-05-31 PROCEDURE — 99213 OFFICE O/P EST LOW 20 MIN: CPT | Mod: 25

## 2024-05-31 PROCEDURE — 76830 TRANSVAGINAL US NON-OB: CPT

## 2024-05-31 PROCEDURE — 36415 COLL VENOUS BLD VENIPUNCTURE: CPT

## 2024-06-10 ENCOUNTER — APPOINTMENT (OUTPATIENT)
Dept: HUMAN REPRODUCTION | Facility: CLINIC | Age: 39
End: 2024-06-10
Payer: COMMERCIAL

## 2024-06-10 PROCEDURE — 76857 US EXAM PELVIC LIMITED: CPT

## 2024-06-10 PROCEDURE — 36415 COLL VENOUS BLD VENIPUNCTURE: CPT

## 2024-06-10 PROCEDURE — 99213 OFFICE O/P EST LOW 20 MIN: CPT | Mod: 25

## 2024-06-11 ENCOUNTER — APPOINTMENT (OUTPATIENT)
Dept: FAMILY MEDICINE | Facility: CLINIC | Age: 39
End: 2024-06-11
Payer: COMMERCIAL

## 2024-06-11 VITALS
OXYGEN SATURATION: 98 % | HEART RATE: 65 BPM | WEIGHT: 138 LBS | HEIGHT: 65 IN | BODY MASS INDEX: 22.99 KG/M2 | RESPIRATION RATE: 15 BRPM | SYSTOLIC BLOOD PRESSURE: 106 MMHG | DIASTOLIC BLOOD PRESSURE: 70 MMHG

## 2024-06-11 DIAGNOSIS — Z87.898 PERSONAL HISTORY OF OTHER SPECIFIED CONDITIONS: ICD-10-CM

## 2024-06-11 DIAGNOSIS — E06.3 AUTOIMMUNE THYROIDITIS: ICD-10-CM

## 2024-06-11 DIAGNOSIS — E22.1 HYPERPROLACTINEMIA: ICD-10-CM

## 2024-06-11 DIAGNOSIS — G37.9 DEMYELINATING DISEASE OF CENTRAL NERVOUS SYSTEM, UNSPECIFIED: ICD-10-CM

## 2024-06-11 DIAGNOSIS — Z86.59 PERSONAL HISTORY OF OTHER MENTAL AND BEHAVIORAL DISORDERS: ICD-10-CM

## 2024-06-11 DIAGNOSIS — Z11.3 ENCOUNTER FOR SCREENING FOR INFECTIONS WITH A PREDOMINANTLY SEXUAL MODE OF TRANSMISSION: ICD-10-CM

## 2024-06-11 DIAGNOSIS — Z83.438 FAMILY HISTORY OF OTHER DISORDER OF LIPOPROTEIN METABOLISM AND OTHER LIPIDEMIA: ICD-10-CM

## 2024-06-11 DIAGNOSIS — N89.8 OTHER SPECIFIED NONINFLAMMATORY DISORDERS OF VAGINA: ICD-10-CM

## 2024-06-11 DIAGNOSIS — R20.2 ANESTHESIA OF SKIN: ICD-10-CM

## 2024-06-11 DIAGNOSIS — Z87.42 PERSONAL HISTORY OF OTHER DISEASES OF THE FEMALE GENITAL TRACT: ICD-10-CM

## 2024-06-11 DIAGNOSIS — R59.9 ENLARGED LYMPH NODES, UNSPECIFIED: ICD-10-CM

## 2024-06-11 DIAGNOSIS — Z12.39 ENCOUNTER FOR OTHER SCREENING FOR MALIGNANT NEOPLASM OF BREAST: ICD-10-CM

## 2024-06-11 DIAGNOSIS — Z86.2 PERSONAL HISTORY OF DISEASES OF THE BLOOD AND BLOOD-FORMING ORGANS AND CERTAIN DISORDERS INVOLVING THE IMMUNE MECHANISM: ICD-10-CM

## 2024-06-11 DIAGNOSIS — G35 MULTIPLE SCLEROSIS: ICD-10-CM

## 2024-06-11 DIAGNOSIS — Z00.00 ENCOUNTER FOR GENERAL ADULT MEDICAL EXAMINATION W/OUT ABNORMAL FINDINGS: ICD-10-CM

## 2024-06-11 DIAGNOSIS — R20.0 ANESTHESIA OF SKIN: ICD-10-CM

## 2024-06-11 DIAGNOSIS — Z98.890 OTHER SPECIFIED POSTPROCEDURAL STATES: ICD-10-CM

## 2024-06-11 DIAGNOSIS — Z31.69 ENCOUNTER FOR OTHER GENERAL COUNSELING AND ADVICE ON PROCREATION: ICD-10-CM

## 2024-06-11 DIAGNOSIS — R45.1 RESTLESSNESS AND AGITATION: ICD-10-CM

## 2024-06-11 DIAGNOSIS — R74.01 ELEVATION OF LEVELS OF LIVER TRANSAMINASE LEVELS: ICD-10-CM

## 2024-06-11 PROCEDURE — 99385 PREV VISIT NEW AGE 18-39: CPT | Mod: 25

## 2024-06-11 RX ORDER — PRENATAL VIT/IRON FUM/FOLIC AC 65 MG-1 MG
TABLET ORAL
Refills: 0 | Status: ACTIVE | COMMUNITY

## 2024-06-11 RX ORDER — PROMETHAZINE HYDROCHLORIDE AND DEXTROMETHORPHAN HYDROBROMIDE ORAL SOLUTION 15; 6.25 MG/5ML; MG/5ML
6.25-15 SOLUTION ORAL
Qty: 70 | Refills: 0 | Status: COMPLETED | COMMUNITY
Start: 2018-02-22 | End: 2024-06-11

## 2024-06-11 RX ORDER — MULTIVIT-MIN/IRON/FOLIC ACID/K 18-600-40
400 CAPSULE ORAL
Refills: 0 | Status: ACTIVE | COMMUNITY

## 2024-06-11 RX ORDER — OSELTAMIVIR PHOSPHATE 75 MG/1
75 CAPSULE ORAL
Qty: 10 | Refills: 0 | Status: DISCONTINUED | COMMUNITY
Start: 2018-02-20 | End: 2024-06-11

## 2024-06-11 RX ORDER — LIOTHYRONINE SODIUM 5 UG/1
5 TABLET ORAL
Refills: 0 | Status: ACTIVE | COMMUNITY

## 2024-06-11 RX ORDER — CALCIUM CARBONATE/VITAMIN D3 500-10/5ML
30 LIQUID (ML) ORAL
Refills: 0 | Status: ACTIVE | COMMUNITY

## 2024-06-11 RX ORDER — FERROUS GLUCONATE 256(28)MG
TABLET ORAL
Refills: 0 | Status: ACTIVE | COMMUNITY

## 2024-06-11 RX ORDER — LEVOTHYROXINE SODIUM 0.03 MG/1
25 TABLET ORAL
Refills: 0 | Status: ACTIVE | COMMUNITY

## 2024-06-11 RX ORDER — PERPHENAZINE 8 MG
500 TABLET ORAL
Refills: 0 | Status: ACTIVE | COMMUNITY

## 2024-06-11 RX ORDER — UBIDECARENONE 200 MG
CAPSULE ORAL
Refills: 0 | Status: ACTIVE | COMMUNITY

## 2024-06-11 NOTE — HISTORY OF PRESENT ILLNESS
[FreeTextEntry1] : New CPE [de-identified] : The patient is a 39yo female with pmhx of MS whp presents today for a CPE.  Vaccinations: defers Flu, covid19 defers, Tdap -- per pt UTD  Last Papsmear with HPV testin; LMP ; regular menstrual cycle   UTD with routine dental and eye exams  Follows with neurology and neuroop for hx of MS Follows with OB/fertility for fertility  Follows with endocrinology for hx of Hashimoto's and hx of elevated prolactin

## 2024-06-11 NOTE — REVIEW OF SYSTEMS
[Headache] : headache [Fever] : no fever [Chills] : no chills [Pain] : no pain [Vision Problems] : no vision problems [Nasal Discharge] : no nasal discharge [Sore Throat] : no sore throat [Chest Pain] : no chest pain [Palpitations] : no palpitations [Lower Ext Edema] : no lower extremity edema [Shortness Of Breath] : no shortness of breath [Cough] : no cough [Dyspnea on Exertion] : no dyspnea on exertion [Abdominal Pain] : no abdominal pain [Constipation] : no constipation [Diarrhea] : diarrhea [Vomiting] : no vomiting [Melena] : no melena [Dysuria] : no dysuria [Hematuria] : no hematuria [Muscle Weakness] : no muscle weakness [Muscle Pain] : no muscle pain [Itching] : no itching [Skin Rash] : no skin rash [Dizziness] : no dizziness [Insomnia] : no insomnia [Depression] : no depression [Easy Bleeding] : no easy bleeding [Easy Bruising] : no easy bruising [de-identified] : +small lump on back of neck [de-identified] :  +neuropathy

## 2024-06-11 NOTE — HEALTH RISK ASSESSMENT
[Yes] : Yes [Monthly or less (1 pt)] : Monthly or less (1 point) [1 or 2 (0 pts)] : 1 or 2 (0 points) [Never (0 pts)] : Never (0 points) [No] : In the past 12 months have you used drugs other than those required for medical reasons? No [Little interest or pleasure doing things] : 1) Little interest or pleasure doing things [Feeling down, depressed, or hopeless] : 2) Feeling down, depressed, or hopeless [0] : 2) Feeling down, depressed, or hopeless: Not at all (0) [PHQ-2 Negative - No further assessment needed] : PHQ-2 Negative - No further assessment needed [With Significant Other] : lives with significant other [On disability] : on disability [] :  [Sexually Active] : sexually active [Fully functional (bathing, dressing, toileting, transferring, walking, feeding)] : Fully functional (bathing, dressing, toileting, transferring, walking, feeding) [Fully functional (using the telephone, shopping, preparing meals, housekeeping, doing laundry, using] : Fully functional and needs no help or supervision to perform IADLs (using the telephone, shopping, preparing meals, housekeeping, doing laundry, using transportation, managing medications and managing finances) [Never] : Never [Audit-CScore] : 0 [de-identified] : carido and strength training  [de-identified] : balanced  [[id number]] : 0 [High Risk Behavior] : no high risk behavior [NO] : No

## 2024-06-11 NOTE — PHYSICAL EXAM
[No Acute Distress] : no acute distress [Well-Appearing] : well-appearing [Normal Sclera/Conjunctiva] : normal sclera/conjunctiva [PERRL] : pupils equal round and reactive to light [EOMI] : extraocular movements intact [Normal Outer Ear/Nose] : the outer ears and nose were normal in appearance [Normal Oropharynx] : the oropharynx was normal [Normal TMs] : both tympanic membranes were normal [Normal Nasal Mucosa] : the nasal mucosa was normal [Supple] : supple [No Respiratory Distress] : no respiratory distress  [No Accessory Muscle Use] : no accessory muscle use [Clear to Auscultation] : lungs were clear to auscultation bilaterally [Normal Rate] : normal rate  [Regular Rhythm] : with a regular rhythm [Normal S1, S2] : normal S1 and S2 [No Edema] : there was no peripheral edema [Soft] : abdomen soft [Non Tender] : non-tender [Non-distended] : non-distended [Normal Bowel Sounds] : normal bowel sounds [Normal Anterior Cervical Nodes] : no anterior cervical lymphadenopathy [No CVA Tenderness] : no CVA  tenderness [No Spinal Tenderness] : no spinal tenderness [No Joint Swelling] : no joint swelling [Grossly Normal Strength/Tone] : grossly normal strength/tone [No Rash] : no rash [Coordination Grossly Intact] : coordination grossly intact [Normal Affect] : the affect was normal [Normal Insight/Judgement] : insight and judgment were intact [de-identified] : +small firm nodule on posterior neck [de-identified] : +small firm nodule on posterior neck

## 2024-06-11 NOTE — PLAN
[FreeTextEntry1] : 1. CPE - routine labs ordered (CBC, CMP, Lipid panel, TSH, A1c) - STD screening ordered (blood) - UTD with papsmear - Vaccinations: defers Flu, covid19 defers, Tdap -- per pt UTD - Depression screen with PHQ2 negative - UTD with dental and eye exams  2. MS - currently symptoms are well managed - Follows with neurology and neuro-opthalmology   3. Hx of Elevated Prolactin - pt reports workup with endocrinology and believes it was normal - she will follow up with endocrinology  4. Hx of Hashimoto's - currently on levothyroxine for fertility as she is trying to get pregnant - Follows with endocrinology and fertility specialist   5. 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 She feels lymph node is already decreasing in size

## 2024-06-12 ENCOUNTER — APPOINTMENT (OUTPATIENT)
Dept: HUMAN REPRODUCTION | Facility: CLINIC | Age: 39
End: 2024-06-12
Payer: COMMERCIAL

## 2024-06-12 ENCOUNTER — APPOINTMENT (OUTPATIENT)
Dept: ULTRASOUND IMAGING | Facility: CLINIC | Age: 39
End: 2024-06-12

## 2024-06-12 PROCEDURE — 36415 COLL VENOUS BLD VENIPUNCTURE: CPT

## 2024-06-13 ENCOUNTER — APPOINTMENT (OUTPATIENT)
Dept: ULTRASOUND IMAGING | Facility: CLINIC | Age: 39
End: 2024-06-13

## 2024-06-13 ENCOUNTER — APPOINTMENT (OUTPATIENT)
Dept: HUMAN REPRODUCTION | Facility: CLINIC | Age: 39
End: 2024-06-13
Payer: COMMERCIAL

## 2024-06-13 DIAGNOSIS — R76.8 OTHER SPECIFIED ABNORMAL IMMUNOLOGICAL FINDINGS IN SERUM: ICD-10-CM

## 2024-06-13 LAB
ALBUMIN SERPL ELPH-MCNC: 4.3 G/DL
ALP BLD-CCNC: 75 U/L
ALT SERPL-CCNC: 13 U/L
ANION GAP SERPL CALC-SCNC: 8 MMOL/L
AST SERPL-CCNC: 17 U/L
BILIRUB SERPL-MCNC: 0.4 MG/DL
BUN SERPL-MCNC: 14 MG/DL
CALCIUM SERPL-MCNC: 9.2 MG/DL
CHLORIDE SERPL-SCNC: 106 MMOL/L
CHOLEST SERPL-MCNC: 186 MG/DL
CO2 SERPL-SCNC: 26 MMOL/L
CREAT SERPL-MCNC: 0.71 MG/DL
EBV EA AB SER IA-ACNC: 133 U/ML
EBV EA AB TITR SER IF: POSITIVE
EBV EA IGG SER QL IA: >600 U/ML
EBV EA IGG SER-ACNC: POSITIVE
EBV EA IGM SER IA-ACNC: NEGATIVE
EBV PATRN SPEC IB-IMP: NORMAL
EBV VCA IGG SER IA-ACNC: >750 U/ML
EBV VCA IGM SER QL IA: <10 U/ML
EGFR: 112 ML/MIN/1.73M2
EPSTEIN-BARR VIRUS CAPSID ANTIGEN IGG: POSITIVE
ESTIMATED AVERAGE GLUCOSE: 100 MG/DL
GLUCOSE SERPL-MCNC: 86 MG/DL
HBA1C MFR BLD HPLC: 5.1 %
HBV CORE IGG+IGM SER QL: NONREACTIVE
HCT VFR BLD CALC: 34.1 %
HCV AB SER QL: NONREACTIVE
HCV S/CO RATIO: 0.08 S/CO
HDLC SERPL-MCNC: 66 MG/DL
HGB BLD-MCNC: 10.9 G/DL
HIV1+2 AB SPEC QL IA.RAPID: NONREACTIVE
LDLC SERPL CALC-MCNC: 107 MG/DL
MCHC RBC-ENTMCNC: 29.5 PG
MCHC RBC-ENTMCNC: 32 GM/DL
MCV RBC AUTO: 92.4 FL
NONHDLC SERPL-MCNC: 120 MG/DL
PLATELET # BLD AUTO: 160 K/UL
POTASSIUM SERPL-SCNC: 4 MMOL/L
PROT SERPL-MCNC: 6.6 G/DL
RBC # BLD: 3.69 M/UL
RBC # FLD: 12.4 %
SODIUM SERPL-SCNC: 141 MMOL/L
T PALLIDUM AB SER QL IA: NEGATIVE
TRIGL SERPL-MCNC: 67 MG/DL
TSH SERPL-ACNC: 1.26 UIU/ML
WBC # FLD AUTO: 6.07 K/UL

## 2024-06-13 PROCEDURE — 76857 US EXAM PELVIC LIMITED: CPT

## 2024-06-13 PROCEDURE — 36415 COLL VENOUS BLD VENIPUNCTURE: CPT

## 2024-06-13 PROCEDURE — 76536 US EXAM OF HEAD AND NECK: CPT

## 2024-06-13 PROCEDURE — 99213 OFFICE O/P EST LOW 20 MIN: CPT | Mod: 25

## 2024-06-14 ENCOUNTER — APPOINTMENT (OUTPATIENT)
Dept: HUMAN REPRODUCTION | Facility: CLINIC | Age: 39
End: 2024-06-14

## 2024-07-10 ENCOUNTER — APPOINTMENT (OUTPATIENT)
Dept: HUMAN REPRODUCTION | Facility: CLINIC | Age: 39
End: 2024-07-10
Payer: COMMERCIAL

## 2024-07-10 PROCEDURE — 99213 OFFICE O/P EST LOW 20 MIN: CPT | Mod: 25

## 2024-07-10 PROCEDURE — 76857 US EXAM PELVIC LIMITED: CPT

## 2024-07-10 PROCEDURE — 36415 COLL VENOUS BLD VENIPUNCTURE: CPT

## 2024-07-11 ENCOUNTER — APPOINTMENT (OUTPATIENT)
Dept: HUMAN REPRODUCTION | Facility: CLINIC | Age: 39
End: 2024-07-11
Payer: COMMERCIAL

## 2024-07-11 PROCEDURE — 36415 COLL VENOUS BLD VENIPUNCTURE: CPT

## 2024-07-12 ENCOUNTER — APPOINTMENT (OUTPATIENT)
Dept: HUMAN REPRODUCTION | Facility: CLINIC | Age: 39
End: 2024-07-12
Payer: COMMERCIAL

## 2024-07-12 PROCEDURE — 99213 OFFICE O/P EST LOW 20 MIN: CPT | Mod: 25

## 2024-07-12 PROCEDURE — 58322 ARTIFICIAL INSEMINATION: CPT

## 2024-07-12 PROCEDURE — 89261 SPERM ISOLATION COMPLEX: CPT

## 2024-07-12 PROCEDURE — 99459 PELVIC EXAMINATION: CPT

## 2024-07-26 ENCOUNTER — APPOINTMENT (OUTPATIENT)
Dept: HUMAN REPRODUCTION | Facility: CLINIC | Age: 39
End: 2024-07-26
Payer: COMMERCIAL

## 2024-07-26 PROCEDURE — 99213 OFFICE O/P EST LOW 20 MIN: CPT | Mod: 25

## 2024-07-26 PROCEDURE — S4042: CPT

## 2024-07-26 PROCEDURE — 76830 TRANSVAGINAL US NON-OB: CPT

## 2024-07-26 PROCEDURE — 36415 COLL VENOUS BLD VENIPUNCTURE: CPT

## 2024-09-04 ENCOUNTER — APPOINTMENT (OUTPATIENT)
Dept: RADIOLOGY | Facility: HOSPITAL | Age: 39
End: 2024-09-04

## 2024-09-05 ENCOUNTER — APPOINTMENT (OUTPATIENT)
Dept: HUMAN REPRODUCTION | Facility: CLINIC | Age: 39
End: 2024-09-05
Payer: COMMERCIAL

## 2024-09-05 PROCEDURE — 99213 OFFICE O/P EST LOW 20 MIN: CPT | Mod: 25

## 2024-09-05 PROCEDURE — 76857 US EXAM PELVIC LIMITED: CPT

## 2024-09-05 PROCEDURE — 36415 COLL VENOUS BLD VENIPUNCTURE: CPT

## 2024-09-06 ENCOUNTER — APPOINTMENT (OUTPATIENT)
Dept: HUMAN REPRODUCTION | Facility: CLINIC | Age: 39
End: 2024-09-06
Payer: COMMERCIAL

## 2024-09-06 PROCEDURE — 58322 ARTIFICIAL INSEMINATION: CPT

## 2024-09-06 PROCEDURE — 89260 SPERM ISOLATION SIMPLE: CPT

## 2024-09-20 ENCOUNTER — APPOINTMENT (OUTPATIENT)
Dept: HUMAN REPRODUCTION | Facility: CLINIC | Age: 39
End: 2024-09-20
Payer: COMMERCIAL

## 2024-09-20 PROCEDURE — 36415 COLL VENOUS BLD VENIPUNCTURE: CPT

## 2024-09-20 PROCEDURE — 99213 OFFICE O/P EST LOW 20 MIN: CPT | Mod: 25

## 2024-09-20 PROCEDURE — 76857 US EXAM PELVIC LIMITED: CPT

## 2024-10-11 ENCOUNTER — APPOINTMENT (OUTPATIENT)
Dept: FAMILY MEDICINE | Facility: CLINIC | Age: 39
End: 2024-10-11

## 2024-10-21 ENCOUNTER — APPOINTMENT (OUTPATIENT)
Dept: HUMAN REPRODUCTION | Facility: CLINIC | Age: 39
End: 2024-10-21
Payer: COMMERCIAL

## 2024-10-21 PROCEDURE — 36415 COLL VENOUS BLD VENIPUNCTURE: CPT

## 2024-10-21 PROCEDURE — 76830 TRANSVAGINAL US NON-OB: CPT

## 2024-10-21 PROCEDURE — S4042: CPT

## 2024-10-21 PROCEDURE — 99213 OFFICE O/P EST LOW 20 MIN: CPT | Mod: 25

## 2024-10-28 ENCOUNTER — APPOINTMENT (OUTPATIENT)
Dept: HUMAN REPRODUCTION | Facility: CLINIC | Age: 39
End: 2024-10-28
Payer: COMMERCIAL

## 2024-10-28 PROCEDURE — 99213 OFFICE O/P EST LOW 20 MIN: CPT | Mod: 25

## 2024-10-28 PROCEDURE — 76857 US EXAM PELVIC LIMITED: CPT

## 2024-10-28 PROCEDURE — 36415 COLL VENOUS BLD VENIPUNCTURE: CPT

## 2024-10-29 ENCOUNTER — APPOINTMENT (OUTPATIENT)
Dept: HUMAN REPRODUCTION | Facility: CLINIC | Age: 39
End: 2024-10-29
Payer: COMMERCIAL

## 2024-10-29 PROCEDURE — 36415 COLL VENOUS BLD VENIPUNCTURE: CPT

## 2024-10-30 ENCOUNTER — APPOINTMENT (OUTPATIENT)
Dept: HUMAN REPRODUCTION | Facility: CLINIC | Age: 39
End: 2024-10-30

## 2024-10-31 ENCOUNTER — APPOINTMENT (OUTPATIENT)
Dept: HUMAN REPRODUCTION | Facility: CLINIC | Age: 39
End: 2024-10-31
Payer: COMMERCIAL

## 2024-10-31 PROCEDURE — 58322 ARTIFICIAL INSEMINATION: CPT

## 2024-10-31 PROCEDURE — 89260 SPERM ISOLATION SIMPLE: CPT

## 2024-11-14 ENCOUNTER — APPOINTMENT (OUTPATIENT)
Dept: HUMAN REPRODUCTION | Facility: CLINIC | Age: 39
End: 2024-11-14
Payer: COMMERCIAL

## 2024-11-14 PROCEDURE — 76857 US EXAM PELVIC LIMITED: CPT

## 2024-11-14 PROCEDURE — 36415 COLL VENOUS BLD VENIPUNCTURE: CPT

## 2024-11-14 PROCEDURE — 99213 OFFICE O/P EST LOW 20 MIN: CPT | Mod: 25

## 2024-11-21 ENCOUNTER — APPOINTMENT (OUTPATIENT)
Dept: MRI IMAGING | Facility: IMAGING CENTER | Age: 39
End: 2024-11-21

## 2024-11-21 ENCOUNTER — OUTPATIENT (OUTPATIENT)
Dept: OUTPATIENT SERVICES | Facility: HOSPITAL | Age: 39
LOS: 1 days | End: 2024-11-21
Payer: COMMERCIAL

## 2024-11-21 DIAGNOSIS — Z98.89 OTHER SPECIFIED POSTPROCEDURAL STATES: Chronic | ICD-10-CM

## 2024-11-21 DIAGNOSIS — Z00.8 ENCOUNTER FOR OTHER GENERAL EXAMINATION: ICD-10-CM

## 2024-11-21 PROCEDURE — 72195 MRI PELVIS W/O DYE: CPT | Mod: 26

## 2024-11-21 PROCEDURE — 72195 MRI PELVIS W/O DYE: CPT

## 2024-11-22 ENCOUNTER — APPOINTMENT (OUTPATIENT)
Dept: HUMAN REPRODUCTION | Facility: CLINIC | Age: 39
End: 2024-11-22

## 2024-12-10 ENCOUNTER — APPOINTMENT (OUTPATIENT)
Dept: FAMILY MEDICINE | Facility: CLINIC | Age: 39
End: 2024-12-10

## 2024-12-17 ENCOUNTER — APPOINTMENT (OUTPATIENT)
Dept: HUMAN REPRODUCTION | Facility: CLINIC | Age: 39
End: 2024-12-17
Payer: COMMERCIAL

## 2024-12-17 PROCEDURE — 36415 COLL VENOUS BLD VENIPUNCTURE: CPT

## 2024-12-17 PROCEDURE — 99213 OFFICE O/P EST LOW 20 MIN: CPT | Mod: 25

## 2024-12-17 PROCEDURE — 76830 TRANSVAGINAL US NON-OB: CPT

## 2024-12-17 PROCEDURE — S4042: CPT

## 2024-12-26 ENCOUNTER — APPOINTMENT (OUTPATIENT)
Dept: HUMAN REPRODUCTION | Facility: CLINIC | Age: 39
End: 2024-12-26
Payer: COMMERCIAL

## 2024-12-26 PROCEDURE — 99459 PELVIC EXAMINATION: CPT

## 2024-12-26 PROCEDURE — 99213 OFFICE O/P EST LOW 20 MIN: CPT | Mod: 25

## 2024-12-26 PROCEDURE — 76857 US EXAM PELVIC LIMITED: CPT

## 2024-12-26 PROCEDURE — 36415 COLL VENOUS BLD VENIPUNCTURE: CPT

## 2025-01-07 ENCOUNTER — APPOINTMENT (OUTPATIENT)
Dept: FAMILY MEDICINE | Facility: CLINIC | Age: 40
End: 2025-01-07
Payer: COMMERCIAL

## 2025-01-07 VITALS
TEMPERATURE: 98.4 F | SYSTOLIC BLOOD PRESSURE: 105 MMHG | HEIGHT: 65 IN | BODY MASS INDEX: 23.16 KG/M2 | DIASTOLIC BLOOD PRESSURE: 66 MMHG | HEART RATE: 85 BPM | OXYGEN SATURATION: 99 % | WEIGHT: 139 LBS | RESPIRATION RATE: 15 BRPM

## 2025-01-07 DIAGNOSIS — E06.3 AUTOIMMUNE THYROIDITIS: ICD-10-CM

## 2025-01-07 DIAGNOSIS — G35 MULTIPLE SCLEROSIS: ICD-10-CM

## 2025-01-07 DIAGNOSIS — E22.1 HYPERPROLACTINEMIA: ICD-10-CM

## 2025-01-07 DIAGNOSIS — R59.9 ENLARGED LYMPH NODES, UNSPECIFIED: ICD-10-CM

## 2025-01-07 DIAGNOSIS — D64.9 ANEMIA, UNSPECIFIED: ICD-10-CM

## 2025-01-07 PROCEDURE — 99214 OFFICE O/P EST MOD 30 MIN: CPT

## 2025-01-07 PROCEDURE — 36415 COLL VENOUS BLD VENIPUNCTURE: CPT

## 2025-01-07 PROCEDURE — G2211 COMPLEX E/M VISIT ADD ON: CPT | Mod: NC

## 2025-01-09 LAB
ALBUMIN SERPL ELPH-MCNC: 4.4 G/DL
ALP BLD-CCNC: 89 U/L
ALT SERPL-CCNC: 17 U/L
ANION GAP SERPL CALC-SCNC: 13 MMOL/L
AST SERPL-CCNC: 14 U/L
BILIRUB SERPL-MCNC: 0.4 MG/DL
BUN SERPL-MCNC: 13 MG/DL
CALCIUM SERPL-MCNC: 9.3 MG/DL
CHLORIDE SERPL-SCNC: 103 MMOL/L
CO2 SERPL-SCNC: 22 MMOL/L
CREAT SERPL-MCNC: 0.79 MG/DL
EGFR: 98 ML/MIN/1.73M2
FERRITIN SERPL-MCNC: 71 NG/ML
FOLATE SERPL-MCNC: 18.1 NG/ML
GLUCOSE SERPL-MCNC: 84 MG/DL
HCT VFR BLD CALC: 34.7 %
HGB BLD-MCNC: 11.7 G/DL
IRON SATN MFR SERPL: 27 %
IRON SERPL-MCNC: 105 UG/DL
MCHC RBC-ENTMCNC: 30 PG
MCHC RBC-ENTMCNC: 33.7 G/DL
MCV RBC AUTO: 89 FL
PLATELET # BLD AUTO: 193 K/UL
POTASSIUM SERPL-SCNC: 3.9 MMOL/L
PROT SERPL-MCNC: 7.4 G/DL
RBC # BLD: 3.9 M/UL
RBC # FLD: 12.1 %
SODIUM SERPL-SCNC: 138 MMOL/L
TIBC SERPL-MCNC: 385 UG/DL
UIBC SERPL-MCNC: 280 UG/DL
VIT B12 SERPL-MCNC: 815 PG/ML
WBC # FLD AUTO: 5.99 K/UL

## 2025-02-05 ENCOUNTER — APPOINTMENT (OUTPATIENT)
Dept: HUMAN REPRODUCTION | Facility: CLINIC | Age: 40
End: 2025-02-05
Payer: COMMERCIAL

## 2025-02-05 PROCEDURE — 76857 US EXAM PELVIC LIMITED: CPT

## 2025-02-05 PROCEDURE — 36415 COLL VENOUS BLD VENIPUNCTURE: CPT

## 2025-02-05 PROCEDURE — 99213 OFFICE O/P EST LOW 20 MIN: CPT | Mod: 25

## 2025-02-12 ENCOUNTER — APPOINTMENT (OUTPATIENT)
Dept: HUMAN REPRODUCTION | Facility: CLINIC | Age: 40
End: 2025-02-12
Payer: COMMERCIAL

## 2025-02-12 PROCEDURE — 36415 COLL VENOUS BLD VENIPUNCTURE: CPT

## 2025-02-12 PROCEDURE — 76857 US EXAM PELVIC LIMITED: CPT

## 2025-02-12 PROCEDURE — 99213 OFFICE O/P EST LOW 20 MIN: CPT | Mod: 25

## 2025-02-13 ENCOUNTER — APPOINTMENT (OUTPATIENT)
Dept: HUMAN REPRODUCTION | Facility: CLINIC | Age: 40
End: 2025-02-13
Payer: COMMERCIAL

## 2025-02-13 PROCEDURE — 36415 COLL VENOUS BLD VENIPUNCTURE: CPT

## 2025-02-15 ENCOUNTER — APPOINTMENT (OUTPATIENT)
Dept: HUMAN REPRODUCTION | Facility: CLINIC | Age: 40
End: 2025-02-15

## 2025-02-15 PROCEDURE — 76857 US EXAM PELVIC LIMITED: CPT

## 2025-02-15 PROCEDURE — 36415 COLL VENOUS BLD VENIPUNCTURE: CPT

## 2025-02-15 PROCEDURE — 99213 OFFICE O/P EST LOW 20 MIN: CPT | Mod: 25

## 2025-03-03 ENCOUNTER — APPOINTMENT (OUTPATIENT)
Dept: HUMAN REPRODUCTION | Facility: CLINIC | Age: 40
End: 2025-03-03
Payer: COMMERCIAL

## 2025-03-03 PROCEDURE — 76857 US EXAM PELVIC LIMITED: CPT

## 2025-03-03 PROCEDURE — 36415 COLL VENOUS BLD VENIPUNCTURE: CPT

## 2025-03-03 PROCEDURE — 99213 OFFICE O/P EST LOW 20 MIN: CPT | Mod: 25

## 2025-03-06 ENCOUNTER — APPOINTMENT (OUTPATIENT)
Dept: HUMAN REPRODUCTION | Facility: CLINIC | Age: 40
End: 2025-03-06
Payer: COMMERCIAL

## 2025-03-06 PROCEDURE — 36415 COLL VENOUS BLD VENIPUNCTURE: CPT

## 2025-03-07 ENCOUNTER — APPOINTMENT (OUTPATIENT)
Dept: HUMAN REPRODUCTION | Facility: CLINIC | Age: 40
End: 2025-03-07
Payer: COMMERCIAL

## 2025-03-07 PROCEDURE — 99214 OFFICE O/P EST MOD 30 MIN: CPT | Mod: 95

## 2025-03-10 ENCOUNTER — APPOINTMENT (OUTPATIENT)
Dept: HUMAN REPRODUCTION | Facility: CLINIC | Age: 40
End: 2025-03-10
Payer: COMMERCIAL

## 2025-03-10 PROCEDURE — 58999I: CUSTOM

## 2025-03-10 PROCEDURE — 99214 OFFICE O/P EST MOD 30 MIN: CPT | Mod: 25

## 2025-03-10 PROCEDURE — 76831 ECHO EXAM UTERUS: CPT

## 2025-03-10 PROCEDURE — 58340 CATHETER FOR HYSTEROGRAPHY: CPT

## 2025-03-28 ENCOUNTER — NON-APPOINTMENT (OUTPATIENT)
Age: 40
End: 2025-03-28

## 2025-04-01 ENCOUNTER — APPOINTMENT (OUTPATIENT)
Dept: OBGYN | Facility: CLINIC | Age: 40
End: 2025-04-01

## 2025-04-01 VITALS
DIASTOLIC BLOOD PRESSURE: 63 MMHG | HEIGHT: 65 IN | SYSTOLIC BLOOD PRESSURE: 122 MMHG | BODY MASS INDEX: 23.49 KG/M2 | WEIGHT: 141 LBS

## 2025-04-01 DIAGNOSIS — Z86.39 PERSONAL HISTORY OF OTHER ENDOCRINE, NUTRITIONAL AND METABOLIC DISEASE: ICD-10-CM

## 2025-04-01 PROCEDURE — 36415 COLL VENOUS BLD VENIPUNCTURE: CPT

## 2025-04-01 PROCEDURE — 99213 OFFICE O/P EST LOW 20 MIN: CPT
